# Patient Record
Sex: FEMALE | Race: AMERICAN INDIAN OR ALASKA NATIVE | NOT HISPANIC OR LATINO | URBAN - METROPOLITAN AREA
[De-identification: names, ages, dates, MRNs, and addresses within clinical notes are randomized per-mention and may not be internally consistent; named-entity substitution may affect disease eponyms.]

---

## 2018-07-14 ENCOUNTER — EMERGENCY (EMERGENCY)
Facility: HOSPITAL | Age: 23
LOS: 1 days | Discharge: HOME | End: 2018-07-14
Attending: EMERGENCY MEDICINE
Payer: COMMERCIAL

## 2018-07-14 VITALS
OXYGEN SATURATION: 100 % | SYSTOLIC BLOOD PRESSURE: 112 MMHG | TEMPERATURE: 98 F | HEART RATE: 66 BPM | RESPIRATION RATE: 17 BRPM | DIASTOLIC BLOOD PRESSURE: 66 MMHG

## 2018-07-14 VITALS
DIASTOLIC BLOOD PRESSURE: 64 MMHG | HEART RATE: 63 BPM | TEMPERATURE: 98 F | OXYGEN SATURATION: 100 % | RESPIRATION RATE: 16 BRPM | SYSTOLIC BLOOD PRESSURE: 114 MMHG

## 2018-07-14 PROCEDURE — 99283 EMERGENCY DEPT VISIT LOW MDM: CPT

## 2018-07-14 PROCEDURE — 99284 EMERGENCY DEPT VISIT MOD MDM: CPT

## 2018-07-14 RX ORDER — OFLOXACIN 0.3 %
1 DROPS OPHTHALMIC (EYE) ONCE
Qty: 0 | Refills: 0 | Status: DISCONTINUED | OUTPATIENT
Start: 2018-07-14 | End: 2018-07-14

## 2018-07-14 RX ORDER — OFLOXACIN OTIC SOLUTION 3 MG/ML
2 SOLUTION/ DROPS AURICULAR (OTIC)
Qty: 1 | Refills: 0 | OUTPATIENT
Start: 2018-07-14 | End: 2018-07-18

## 2018-07-14 RX ORDER — OFLOXACIN 0.3 %
2 DROPS OPHTHALMIC (EYE) ONCE
Qty: 0 | Refills: 0 | Status: COMPLETED | OUTPATIENT
Start: 2018-07-14 | End: 2018-07-14

## 2018-07-14 RX ADMIN — Medication 2 DROP(S): at 23:48

## 2018-07-14 NOTE — ED PROVIDER NOTE - AGGRAVATING FACTORS
**ATTENDING ADDENDUM (Dr. Osman Barcenas): NO movement-associated, pleuritic, reproducible, positional, or exertional component to the symptoms.

## 2018-07-14 NOTE — ED PROVIDER NOTE - ATTENDING CONTRIBUTION TO CARE
**ATTENDING ADDENDUM (Dr. Osman Barcenas): I attest that I have directly examined this patient and reviewed and formulated the diagnostic and therapeutic management plan in collaboration with the EM resident. Please see MDM note and remainder of EMR for findings from CC, HPI, ROS, and PE. (Dym)

## 2018-07-14 NOTE — ED PROVIDER NOTE - RECENT EXPOSURE TO
**ATTENDING ADDENDUM (Dr. Osman Barcenas): NO obvious sick contacts. DENIES recent travel./none known

## 2018-07-14 NOTE — ED PROVIDER NOTE - CARE PLAN
Principal Discharge DX:	Ear pain, right Principal Discharge DX:	Ear pain, right  Goal:	ATTENDING ADDENDUM (Dr. Osman Barcenas): Goals of care include resolution of emergent/urgent symptoms and concerns, and restoration to baseline level of homeostasis.  Assessment and plan of treatment:	ATTENDING ADDENDUM (Dr. Osman Barcenas): (1) anticipatory guidance provided  (2) rest  (3) outpatient follow-up with your primary care physician/provider (4) return if symptoms worsen, persist, or do not resolve (5) medications, if indicated, as prescribed

## 2018-07-14 NOTE — ED ADULT NURSE NOTE - OBJECTIVE STATEMENT
Patient presented to ED ambulatory  c/o right ear pain for 1 day accompanied w/some hearing loss and throat pain when swallowing. Denies chills, cough or any other symptoms.

## 2018-07-14 NOTE — ED PROVIDER NOTE - CHPI ED SYMPTOMS POS
**ATTENDING ADDENDUM (Dr. Osman Barcenas): reports vomiting and "passing out" in the context of the pain/EAR PAIN/PAIN

## 2018-07-14 NOTE — ED PROVIDER NOTE - MEDICAL DECISION MAKING DETAILS
23F, no med hx, presents with right ear after fall into water. Also reports decreased hearing to ear. Exam as above. Concern for possible TM rupture given hx and symptoms. Low suspicion for any infectious etiology given hx and timing of sx. Will d./c home with ENT f/u. Currently stable, no acute distress. Will continue to follow up and re-assess. Case discussed with Attending: Swapna Fallon MD, PGY2 23F, no med hx, presents with right ear after fall into water. Also reports decreased hearing to ear. Exam as above. Concern for possible TM rupture given hx and symptoms. Low suspicion for any infectious etiology given hx and timing of sx. Will d./c home with ENT f/u. Currently stable, no acute distress. Will continue to follow up and re-assess. Case discussed with Attending: Swapna Fallon MD, PGY2  **ATTENDING MEDICAL DECISION MAKING/SYNTHESIS (Dr. Osman Barcenas): I have reviewed the Chief Complaint, the HPI, the ROS, and have directly performed and confirmed the findings on the Physical Examination. I have reviewed the medical decision making with all providers, as applicable. The PROBLEM REPRESENTATION at this time is: 23-year-old woman presenting with bilateral ear pain (right greater than left) s/p swimming earlier today, with sudden onset of pain and decreased hearing (right ear greater than left), after which patient developed episode of nausea and reportedly "passed out". Took naproxen for symptoms (some relief). NO smoker, NO antecedent trauma, NO fevers or chills, NO associated discharge. NO pain with manipulation of the pinna/tragus. The MOST LIKELY DIAGNOSIS, and the LIST OF DIFFERENTIAL DIAGNOSES, includes (but is not limited to) the following: acute otitis media (possible, right-sided more than left), acute otitis externa (possible), perforated TM (considered, less likely), mastoiditis (NO evidence), malignant otitis externa (NO evidence), foreign body (NO evidence), sinusitis (NO evidence), other upper respiratory tract infection. The likelihood of each of these diagnoses has been appropriately considered in the context of this patient's presentation and my evaluation. PLAN: as described in EMR, including diagnostics, therapeutics and consultation as clinically warranted. I will continue to reevaluate the patient, including the results of all testing, and monitor response to therapy throughout the patient's course in the ED.

## 2018-07-14 NOTE — ED PROVIDER NOTE - OBJECTIVE STATEMENT
23F, no med hx, presents with chief complaint of right ear pain. Per patient, jumped off dock into water around 2 hrs ago, and after had severe ear pain in both ears, R>L. Stated that the pain was so bad that she was crying, felt dizzy, vomited, passed out. Denies any symptoms after of chest pain, shortness of breath, abdominal pain, headache, confusion, pain in extremities, diarrhea or constipation, urinary sx, blurry vision, neck or back pain. Patient denies any alcohol or drug use. No tobacco use. Patient reports decreased hearing to right ear. Denies any prior hx of ear pain or hearing issues. Took aleve prior to ED, denies any active pain. 23F, no med hx, presents with chief complaint of right ear pain. Per patient, jumped off dock into water around 2 hrs ago, and after had severe ear pain in both ears, R>L. Stated that the pain was so bad that she was crying, felt dizzy, vomited, passed out. Denies any symptoms after of chest pain, shortness of breath, abdominal pain, headache, confusion, pain in extremities, diarrhea or constipation, urinary sx, blurry vision, neck or back pain. Patient denies any alcohol or drug use. No tobacco use. Patient reports decreased hearing to right ear. Denies any prior hx of ear pain or hearing issues. Took aleve prior to ED, denies any active pain.  **ATTENDING ADDENDUM (Dr. Osman Barcenas): I attest that I have directly and personally interviewed and examined this patient and elicited a comparable history of present illness and review of systems as documented, along with my EM resident. I attest that I have made significant contributions to the documentation where necessary and as noted in the EMR.

## 2018-07-14 NOTE — ED PROVIDER NOTE - PHYSICAL EXAMINATION
Ear exam: no rash or skin changes noted bilaterally. No blood or fluid noted in bilateral ear canal. No pain to palpation or movement of ears bilaterally.   Left ear: TM visualized, no noted deformity, light reflex present  Right ear: TM visualized but not completely. No effusions noted.  Decreased subjective hearing to right ear vs left  Other CN grossly intact  No other focal neuro deficits on exam Ear exam: no rash or skin changes noted bilaterally. No blood or fluid noted in bilateral ear canal. No pain to palpation or movement of ears bilaterally.   Left ear: TM visualized, no noted deformity, light reflex present  Right ear: TM visualized but not completely. No effusions noted.  Decreased subjective hearing to right ear vs left  Other CN grossly intact  No other focal neuro deficits on exam  **ATTENDING ADDENDUM (Dr. Osman Barcenas): I have reviewed and substantially contributed to the elements of the PE as documented above. I have directly performed an examination of this patient in conjunction with the other members (EM resident/PA/NP) of the patient care team. Of note, and in addition to the above, examination of the patient's right TM obscured slightly (edema of canal?). Slight opacification/bulge of right TM noted. NO evidence of perforation. NO hemorrhage. Left TM and otic canal appear clear, without  or debris, and with POSITIVE normal appearing light reflex. NO discharge. NO foreign body in the bilateral canals. NO bilateral mastoid tenderness. NO reported tenderness in the sinuses. NO pain with manipulation of the tragus or pinna bilaterally. POSITIVE subjective decreased hearing in the right ear compared to the left (confirmed with whisper testing, not able to test Hanley/Rinne at this time).

## 2018-07-14 NOTE — ED PROVIDER NOTE - PLAN OF CARE
ATTENDING ADDENDUM (Dr. Osman Barcenas): (1) anticipatory guidance provided  (2) rest  (3) outpatient follow-up with your primary care physician/provider (4) return if symptoms worsen, persist, or do not resolve (5) medications, if indicated, as prescribed ATTENDING ADDENDUM (Dr. Osman Barcenas): Goals of care include resolution of emergent/urgent symptoms and concerns, and restoration to baseline level of homeostasis.

## 2018-07-14 NOTE — ED PROVIDER NOTE - CHPI ED SYMPTOMS NEG
no chills/no nausea/**ATTENDING ADDENDUM (Dr. Osman Barcenas): NO vertigo. POSITIVE decreased hearing. NO tinnitus. NO discharge from ear./no loss of consciousness/no fever

## 2018-07-14 NOTE — ED PROVIDER NOTE - PROGRESS NOTE DETAILS
**ATTENDING ADDENDUM (Dr. Osman Barcenas): patient serially evaluated throughout ED course. NO acute deterioration up to this time in the ED. NO evidence of malignant external ear infection or foreign body. Perforation considered, but NO discharge or hemorrhage noted by patient. More likely that patient has inflammation within canal or behind TM (acute otitis media or non-specific inflammatory effusion). Recommend topical antibiotics and ENT referral. Agree with discharge home with close outpatient followup with primary care physician/provider and with medications (if appropriate, if clinically indicated, and as prescribed, as noted in EMR). Agree with NSAIDS for symptoms (pain, tenderness). Anticipatory guidance provided.

## 2025-05-29 NOTE — ED ADULT NURSE NOTE - RN DISCHARGE SIGNATURE
Detail Level: Generalized
Continue Regimen: Vanicream soaps and moisturizers.
Continue Regimen: Sunscreens SPF 30 or higher and re-apply every 2 hours in the sun.
14-Jul-2018